# Patient Record
Sex: FEMALE
[De-identification: names, ages, dates, MRNs, and addresses within clinical notes are randomized per-mention and may not be internally consistent; named-entity substitution may affect disease eponyms.]

---

## 2020-12-28 ENCOUNTER — NURSE TRIAGE (OUTPATIENT)
Dept: OTHER | Facility: CLINIC | Age: 40
End: 2020-12-28

## 2020-12-28 NOTE — TELEPHONE ENCOUNTER
DIZZINESS-ADULT-[place]    Pod 5    Caller reports symptoms as documented above. Caller informed of disposition. Care advice as documented. Please do not respond to the triage nurse through this encounter. Any subsequent communication should be directly with the patient.

## 2024-04-17 ENCOUNTER — HOSPITAL ENCOUNTER (OUTPATIENT)
Dept: INTERVENTIONAL RADIOLOGY/VASCULAR | Age: 44
Discharge: HOME | End: 2024-04-19
Payer: COMMERCIAL

## 2024-04-17 DIAGNOSIS — R22.31 ARM MASS, RIGHT: ICD-10-CM

## 2024-04-17 PROCEDURE — 76882 US LMTD JT/FCL EVL NVASC XTR: CPT
